# Patient Record
Sex: MALE | Race: WHITE
[De-identification: names, ages, dates, MRNs, and addresses within clinical notes are randomized per-mention and may not be internally consistent; named-entity substitution may affect disease eponyms.]

---

## 2019-02-28 ENCOUNTER — HOSPITAL ENCOUNTER (EMERGENCY)
Dept: HOSPITAL 74 - JER | Age: 54
LOS: 1 days | Discharge: HOME | End: 2019-03-01
Payer: SELF-PAY

## 2019-02-28 VITALS — BODY MASS INDEX: 29.6 KG/M2

## 2019-02-28 DIAGNOSIS — Z79.84: ICD-10-CM

## 2019-02-28 DIAGNOSIS — I10: ICD-10-CM

## 2019-02-28 DIAGNOSIS — E11.9: ICD-10-CM

## 2019-02-28 DIAGNOSIS — E78.00: ICD-10-CM

## 2019-02-28 DIAGNOSIS — R11.10: Primary | ICD-10-CM

## 2019-02-28 PROCEDURE — 3E0337Z INTRODUCTION OF ELECTROLYTIC AND WATER BALANCE SUBSTANCE INTO PERIPHERAL VEIN, PERCUTANEOUS APPROACH: ICD-10-PCS

## 2019-02-28 PROCEDURE — 3E033GC INTRODUCTION OF OTHER THERAPEUTIC SUBSTANCE INTO PERIPHERAL VEIN, PERCUTANEOUS APPROACH: ICD-10-PCS

## 2019-03-01 VITALS — TEMPERATURE: 97.8 F | HEART RATE: 66 BPM | DIASTOLIC BLOOD PRESSURE: 92 MMHG | SYSTOLIC BLOOD PRESSURE: 144 MMHG

## 2019-03-01 LAB
ALBUMIN SERPL-MCNC: 4.6 G/DL (ref 3.4–5)
ALP SERPL-CCNC: 127 U/L (ref 45–117)
ALT SERPL-CCNC: 57 U/L (ref 13–61)
ANION GAP SERPL CALC-SCNC: 8 MMOL/L (ref 8–16)
APPEARANCE UR: CLEAR
AST SERPL-CCNC: 29 U/L (ref 15–37)
BASOPHILS # BLD: 0.5 % (ref 0–2)
BILIRUB SERPL-MCNC: 0.6 MG/DL (ref 0.2–1)
BILIRUB UR STRIP.AUTO-MCNC: NEGATIVE MG/DL
BUN SERPL-MCNC: 14 MG/DL (ref 7–18)
CALCIUM SERPL-MCNC: 8.9 MG/DL (ref 8.5–10.1)
CHLORIDE SERPL-SCNC: 105 MMOL/L (ref 98–107)
CO2 SERPL-SCNC: 25 MMOL/L (ref 21–32)
COLOR UR: YELLOW
CREAT SERPL-MCNC: 0.9 MG/DL (ref 0.55–1.3)
DEPRECATED RDW RBC AUTO: 13 % (ref 11.9–15.9)
EOSINOPHIL # BLD: 3 % (ref 0–4.5)
GLUCOSE SERPL-MCNC: 107 MG/DL (ref 74–106)
HCT VFR BLD CALC: 43.4 % (ref 35.4–49)
HGB BLD-MCNC: 15.3 GM/DL (ref 11.7–16.9)
KETONES UR QL STRIP: (no result)
LEUKOCYTE ESTERASE UR QL STRIP.AUTO: NEGATIVE
LIPASE SERPL-CCNC: 136 U/L (ref 73–393)
LYMPHOCYTES # BLD: 22.1 % (ref 8–40)
MCH RBC QN AUTO: 28.9 PG (ref 25.7–33.7)
MCHC RBC AUTO-ENTMCNC: 35.3 G/DL (ref 32–35.9)
MCV RBC: 81.9 FL (ref 80–96)
MONOCYTES # BLD AUTO: 8.8 % (ref 3.8–10.2)
NEUTROPHILS # BLD: 65.6 % (ref 42.8–82.8)
NITRITE UR QL STRIP: NEGATIVE
PH UR: 5 [PH] (ref 5–8)
PLATELET # BLD AUTO: 271 K/MM3 (ref 134–434)
PMV BLD: 9.1 FL (ref 7.5–11.1)
POTASSIUM SERPLBLD-SCNC: 4.1 MMOL/L (ref 3.5–5.1)
PROT SERPL-MCNC: 8.1 G/DL (ref 6.4–8.2)
PROT UR QL STRIP: NEGATIVE
PROT UR QL STRIP: NEGATIVE
RBC # BLD AUTO: 5.29 M/MM3 (ref 4–5.6)
SODIUM SERPL-SCNC: 138 MMOL/L (ref 136–145)
SP GR UR: 1.02 (ref 1.01–1.03)
UROBILINOGEN UR STRIP-MCNC: 2 MG/DL (ref 0.2–1)
WBC # BLD AUTO: 8.2 K/MM3 (ref 4–10)

## 2019-03-01 NOTE — EKG
Test Reason : 

Blood Pressure : ***/*** mmHG

Vent. Rate : 061 BPM     Atrial Rate : 061 BPM

   P-R Int : 154 ms          QRS Dur : 096 ms

    QT Int : 424 ms       P-R-T Axes : 007 -07 005 degrees

   QTc Int : 426 ms

 

NORMAL SINUS RHYTHM

INFERIOR INFARCT , AGE UNDETERMINED

ABNORMAL ECG

NO PREVIOUS ECGS AVAILABLE

Confirmed by KEZIA GÓMEZ MD ([id number]) on 3/1/2019 10:18:03 AM

 

Referred By:             Confirmed By:KEZIA GÓMEZ MD

## 2019-03-01 NOTE — PDOC
History of Present Illness





- General


Chief Complaint: Pain


Stated Complaint: ABD PAIN


Time Seen by Provider: 03/01/19 00:24


History Source: Patient


Exam Limitations: No Limitations





- History of Present Illness


Travel History: No


Initial Comments: 





03/01/19 00:50





HISTORY OF PRESENT ILLNESS: 53-year-old male with past medical history non-

insulin dependent diabetes, hypertension, hyperlipidemia presents emergency 

department for evaluation of 3 weeks of postprandial epigastric pain with 

vomiting. Patient is unable to describe his pain but gives a reading of 9/10. 

He endorses and inability to hold down any solid foods for the past 3 weeks. 

Patient reports that her normal bowel movement earlier today. Patient's pain is 

accompanied by a "foul taste" the back of his mouth. Patient also complains of 

frequent urination which worsens on the overnight hours. Patient was seen by 

his primary doctor in 2/21 was given a prescription for Bentyl. Patient reports 

the Bentyl has not worked as he's been taking the medication after he eats. He 

denies fevers, chills, chest pain, shortness of breath, dysuria, hematuria, 

rectal bleeding, weight loss.





No recent travel or sick contacts. 


PAST MEDICAL HISTORY: HTN, NIDDM


SURGICAL HISTORY: Denies


ALLERGIES: No known drug allergies





REVIEW OF SYSTEMS


General/Constitutional: Denies fever or chills. Denies weakness, weight change.


HEENT: Denies change in vision. Denies ear pain or discharge. Denies sore 

throat.


Cardiovascular: Denies chest pain or shortness of breath.


Respiratory: Denies cough, wheezing, or hemoptysis.


Gastrointestinal: see HPI


Genitourinary:See HPI


Musculoskeletal: Denies joint or muscle swelling or pain. Denies neck or back 

pain.


Skin and breasts: Denies rash or easy bruising.


Neurologic: Denies headache, vertigo, loss of consciousness, or loss of 

sensation.


Psychiatric: Denies depression or anxiety.


Endocrine: Denies increased thirst. Denies abnormal weight change.


Hematologic/Lymphatic: Denies anemia, easy bleeding, or history of blood clots.


Allergic/Immunologic: Denies hives or skin allergy. Denies latex allergy.











PHYSICAL EXAM


General Appearance: Well-appearing, appropriately dressed.  No apparent distress

, no intoxication.


HEENT: EOMI, PERRLA, normal ENT inspection, normal voice, TMs normal, pharynx 

normal.  No conjunctival pallor.  No photophobia, scleral icterus.


Neck: Supple.  Trachea midline. No tenderness, rigidity, carotid bruit, stridor

, lymphadenopathy, or thyromegaly. 


Respiratory/Chest: Lungs CTAB.  No shortness of breath, chest tenderness, 

respiratory distress, accessory muscle use. No crackles, rales, rhonchi, stridor

, wheezing, dullness


Cardiovascular: RRR. S1, S2.  No JVD, murmur, bradycardia, tachycardia.


Vascular Pulses: Dorsalis-Pedis (R): 2+, Dorsalis-Pedis (L): 2+


Gastrointestinal/Abdominal: Normal bowel sounds. Abdomen soft, non-distended.  

No tenderness or rebound tenderness. No organomegaly, pulsatile mass, guarding, 

hernia, hepatomegaly, splenomegaly.


Lymphatic: No adenopathy, tenderness.


Integumentary: Appropriate color, dry, warm.  No cyanosis, erythema, jaundice 

or rash


Neurologic: CNs II-XII intact. Fully oriented, alert.  Appropriate mood/affect. 

Motor strength 5/5.  No appreciable EOM palsy, facial droop or sensory deficit.








Past History





- Past Medical History


Allergies/Adverse Reactions: 


 Allergies











Allergy/AdvReac Type Severity Reaction Status Date / Time


 


No Known Allergies Allergy   Verified 03/01/19 00:23











Home Medications: 


Ambulatory Orders





NK [No Known Home Medication]  03/01/19 











- Suicide/Smoking/Psychosocial Hx


Smoking History: Never smoked


Have you smoked in the past 12 months: No


Information on smoking cessation initiated: No


Hx Alcohol Use: No


Drug/Substance Use Hx: No





*Physical Exam





- Vital Signs


 Last Vital Signs











Temp Pulse Resp BP Pulse Ox


 


 97.8 F   66   19   144/92   98 


 


 02/28/19 23:20  02/28/19 23:20  02/28/19 23:20  02/28/19 23:20  02/28/19 23:20














Moderate Sedation





- Procedure Monitoring


Vital Signs: 


Procedure Monitoring Vital Signs











Temperature  97.8 F   02/28/19 23:20


 


Pulse Rate  66   02/28/19 23:20


 


Respiratory Rate  19   02/28/19 23:20


 


Blood Pressure  144/92   02/28/19 23:20


 


O2 Sat by Pulse Oximetry (%)  98   02/28/19 23:20











**Heart Score/ECG Review





- History


History: Slightly suspicious





- Electrocardiogram


EKG: Normal





- Age


Age: 45-65





- Risk Factors


Risk Factors Heart Score: Yes Hx Hypercholesterolemia, Yes Hx Hypertension, Yes 

Hx Diabetes


Based on the list above the patient has:: >/=3 risk factors or Hx 

atherosclerotic disease





- Troponin


Troponin: </= normal limit





- Score


Heart Score - Total: 3





- ECG Intrepretation


Rhythm: Regular Rhythm





- ECG Impressions


Normal ECG: Yes





ED Treatment Course





- LABORATORY


CBC & Chemistry Diagram: 


 03/01/19 00:41





 03/01/19 00:41





Medical Decision Making





- Medical Decision Making





03/01/19 00:50


A/P:


53-year-old male with postprandial abdominal pain, nausea and vomiting for 3 

weeks





Differential diagnosis includes but not limited to ACS, pancreatitis, 

gallbladder disease, gastritis, gastroparesis, metabolic abnormality, GERD, 

malignancy, hyperglycemia





Labs including cardiac profile and lipase


EKG


Urinalysis, urine culture


Normal saline 1 L


Zofran 4 mg


03/01/19 01:48


CBC is within normal limits


CMP is within normal limits


Lipase 136


UA reveals trace ketones is consistent with the patient vomiting. Negative 

glucose


Troponin 0.05.





EKG is reviewed by me and interpreted by Dr. Cavazos: Sinus rhythm with rate of 

61. Normal intervals noted. No ischemic changes present.


03/01/19 02:01


Repeat abdominal exam is unchanged and remains unremarkable.


Patient is tolerating PO's at this time. I will discharge the home to follow-up 

with his primary doctor and continue his current plan of care.





I discussed the physical exam findings, ancillary test results and final 

diagnoses with the patient. I answered all of the patient's questions. The 

patient was satisfied with the care received and felt comfortable with the 

discharge plan and treatment plan. The patient will call their primary care 

physician within 24 hours to arrange follow-up and will return to the Emergency 

Department with any new, persistent or worsening symptoms. 








*DC/Admit/Observation/Transfer


Diagnosis at time of Disposition: 


Vomiting


Qualifiers:


 Vomiting type: unspecified Vomiting Intractability: non-intractable Nausea 

presence: without nausea Qualified Code(s): R11.11 - Vomiting without nausea








- Discharge Dispostion


Disposition: HOME


Condition at time of disposition: Fair





- Referrals


Referrals: 


Oh Harris MD [Primary Care Provider] - 





- Patient Instructions


Additional Instructions: 


Rest, drink lots of fluids: Teas, water, soups


Ginger ale, carbonated beverages for the bubbles


May try peppermint teas


Avoid heavy, spicy or fatty foods until symptoms have resolved 





Lots of handwashing and good hygiene





Continue over-the-counter medications for symptomatic relief


Tylenol for fever and pain


Continue Bentyl as previously prescribed.





Followup with private physician in one to 2 days as needed


Return to emergency department for worsened symptoms, fevers, dehydration














sal [place] muchos lquidos: Ts, agua, sopas.


Ginger ale, bebidas carbonatadas para las burbujas.


Puede probar t de menta


Evite los alimentos pesados, picantes o grasos hasta que los sntomas hayan 

desaparecido.





Mucho lavado de hernandez y buena higiene.





Continuar con los medicamentos de venta cara para el alivio sintomtico.


Tylenol para la fiebre y el dolor.


Continuar Bentyl segn lo prescrito anteriormente.





Seguimiento con mdico privado en 1 a 2 wing segn sea necesario.


Volver al servicio de urgencias para los sntomas empeorados, fiebres, 

deshidratacin.





- Post Discharge Activity

## 2019-03-02 ENCOUNTER — HOSPITAL ENCOUNTER (EMERGENCY)
Dept: HOSPITAL 74 - JER | Age: 54
LOS: 1 days | Discharge: HOME | End: 2019-03-03
Payer: SELF-PAY

## 2019-03-02 VITALS — SYSTOLIC BLOOD PRESSURE: 154 MMHG | TEMPERATURE: 97.8 F | DIASTOLIC BLOOD PRESSURE: 86 MMHG | HEART RATE: 69 BPM

## 2019-03-02 VITALS — BODY MASS INDEX: 29.6 KG/M2

## 2019-03-02 DIAGNOSIS — I10: ICD-10-CM

## 2019-03-02 DIAGNOSIS — E78.5: ICD-10-CM

## 2019-03-02 DIAGNOSIS — R10.13: Primary | ICD-10-CM

## 2019-03-02 DIAGNOSIS — Z79.84: ICD-10-CM

## 2019-03-02 DIAGNOSIS — E11.9: ICD-10-CM

## 2019-03-02 LAB
ALBUMIN SERPL-MCNC: 4.6 G/DL (ref 3.4–5)
ALP SERPL-CCNC: 112 U/L (ref 45–117)
ALT SERPL-CCNC: 58 U/L (ref 13–61)
ANION GAP SERPL CALC-SCNC: 10 MMOL/L (ref 8–16)
APPEARANCE UR: CLEAR
AST SERPL-CCNC: 38 U/L (ref 15–37)
BASOPHILS # BLD: 0.5 % (ref 0–2)
BILIRUB SERPL-MCNC: 0.6 MG/DL (ref 0.2–1)
BILIRUB UR STRIP.AUTO-MCNC: NEGATIVE MG/DL
BUN SERPL-MCNC: 11 MG/DL (ref 7–18)
CALCIUM SERPL-MCNC: 8.7 MG/DL (ref 8.5–10.1)
CHLORIDE SERPL-SCNC: 106 MMOL/L (ref 98–107)
CO2 SERPL-SCNC: 24 MMOL/L (ref 21–32)
COLOR UR: (no result)
CREAT SERPL-MCNC: 0.8 MG/DL (ref 0.55–1.3)
DEPRECATED RDW RBC AUTO: 13 % (ref 11.9–15.9)
EOSINOPHIL # BLD: 2.6 % (ref 0–4.5)
GLUCOSE SERPL-MCNC: 80 MG/DL (ref 74–106)
HCT VFR BLD CALC: 40.4 % (ref 35.4–49)
HGB BLD-MCNC: 14.4 GM/DL (ref 11.7–16.9)
KETONES UR QL STRIP: NEGATIVE
LEUKOCYTE ESTERASE UR QL STRIP.AUTO: NEGATIVE
LIPASE SERPL-CCNC: 134 U/L (ref 73–393)
LYMPHOCYTES # BLD: 30.9 % (ref 8–40)
MCH RBC QN AUTO: 29.2 PG (ref 25.7–33.7)
MCHC RBC AUTO-ENTMCNC: 35.7 G/DL (ref 32–35.9)
MCV RBC: 81.8 FL (ref 80–96)
MONOCYTES # BLD AUTO: 13 % (ref 3.8–10.2)
NEUTROPHILS # BLD: 53 % (ref 42.8–82.8)
NITRITE UR QL STRIP: NEGATIVE
PH UR: 6 [PH] (ref 5–8)
PLATELET # BLD AUTO: 280 K/MM3 (ref 134–434)
PMV BLD: 8.3 FL (ref 7.5–11.1)
POTASSIUM SERPLBLD-SCNC: 4 MMOL/L (ref 3.5–5.1)
PROT SERPL-MCNC: 7.7 G/DL (ref 6.4–8.2)
PROT UR QL STRIP: NEGATIVE
PROT UR QL STRIP: NEGATIVE
RBC # BLD AUTO: 4.93 M/MM3 (ref 4–5.6)
SODIUM SERPL-SCNC: 140 MMOL/L (ref 136–145)
SP GR UR: 1.01 (ref 1.01–1.03)
UROBILINOGEN UR STRIP-MCNC: NEGATIVE MG/DL (ref 0.2–1)
WBC # BLD AUTO: 5.8 K/MM3 (ref 4–10)

## 2019-03-02 PROCEDURE — 3E033NZ INTRODUCTION OF ANALGESICS, HYPNOTICS, SEDATIVES INTO PERIPHERAL VEIN, PERCUTANEOUS APPROACH: ICD-10-PCS | Performed by: EMERGENCY MEDICINE

## 2019-03-02 PROCEDURE — 3E033GC INTRODUCTION OF OTHER THERAPEUTIC SUBSTANCE INTO PERIPHERAL VEIN, PERCUTANEOUS APPROACH: ICD-10-PCS | Performed by: EMERGENCY MEDICINE

## 2019-03-02 NOTE — PDOC
Attending Attestation





- HPI


HPI: 


This patient is a 53 year old male with PMHx of HTN, NIDDM, HLD, who presents 

to ED with 3 weeks of epigastric pain. Patient was seen by his primary doctor 

on 2/21 and was given a prescription for Bentyl. Patient reports the Bentyl has 

not worked as he's been taking the medication after he eats. Patient states 

that he has had this pain before and gets it every year but it has never lasted 

this long before. Patient states the pain is  cramp-like, localized in 

epigastric area, does not radiate, unaware of any exacerbating or alleviating 

factors,  rates 9/10, associated with nausea and early satiety. Also endorses 

urinary frequency and slight dysuria, frontal headache for which he attributes 

to not eating or drinking well. He was seen here a yesterday for similar 

complaints. 





Denies any vomit, diarrhea, constipation, blood in stool, fevers, chills, chest 

pain, SOB, hematuria, syncope, pain radiating to the back, flank pain. 





PMD:Oh Harris MD


PMH: see hpi


PSH: none


Meds: metformin, losartan, dicyclomine


Allergies: nkda


Social: denies








03/02/19 21:27


Patient has dup chart from 3/1/19








- Physicial Exam


PE: 


GENERAL: Awake, alert, and fully oriented, in no acute distress


HEAD: No signs of trauma


EYES: PERRLA, EOMI, sclera anicteric, conjunctiva clear


LUNGS: Breath sounds equal, clear to auscultation bilaterally.  No wheezes, and 

no crackles


HEART: Regular rate and rhythm, normal S1 and S2, no murmurs, rubs or gallops


ABDOMEN: Soft, nontender, normoactive bowel sounds.  No guarding, no rebound.  

No masses


EXTREMITIES: Normal range of motion, no edema.  No clubbing or cyanosis. No 

cords, erythema, or tenderness


NEUROLOGICAL: Cranial nerves II through XII grossly intact.  Normal speech, 

normal gait


SKIN: Warm, Dry, normal turgor, no rashes or lesions noted.





03/02/19 21:26








<Tiara Wolfe - Last Filed: 03/02/19 21:27>





- Resident


Resident Name: Tatyana Polanco





- Medical Decision Making





03/02/19 23:16


Pt presents to the ED complaining of epigastric pain that has been persistent 

for three weeks.  Denies chest pain, shortness of breath, nausea or vomiting.  

History of multiple prior episodes of similar pain which resolved spontaneously 

in the past.  Labs show troponin of 0.06.  Labs drawn yesterday under a 

different MR showed troponin of 0.05.  Will check repeat troponin and admit if 

rising.  Will discharge home with cardiology and GI follow up if decreasing. 


03/02/19 23:21








<Paloma Chavez - Last Filed: 03/02/19 23:22>





Attestations





- Attestations





03/02/19 21:26





Documentation prepared by Tiara Wolfe, acting as medical scribe for 

Paloma Chavez MD.





<Tiara Wolfe - Last Filed: 03/02/19 21:27>

## 2019-03-02 NOTE — PDOC
History of Present Illness





- General


Chief Complaint: Pain, Acute


Stated Complaint: NAUSEA/HEADACHE


Time Seen by Provider: 03/02/19 19:23


History Source: Patient


Exam Limitations: Language Barrier





- History of Present Illness


Travel History: No


Initial Comments: 





03/02/19 19:55


Pt is a 54yo M with PMH of HTN, NIDDM presenting to ED with 2 weeks of 

epigastric pain and nausea. Pt has had this pain before and gets it every year 

but it has never lasted this long before. Pt states the pain is in the 

epigastric area, does not radiate, 9/10, cramping, associated with nausea and 

early satiety. He feels the need to vomit to relieve the pain but he has not 

vomited. Also endorses urinary frequency and slight dysuria. Also is 

experiencing a frontal headache for which he attributes to not eating or 

drinking well. He denies diarrhea, constipation, blood in stool, fevers, chills

, chest pain, SOB, hematuria, syncope, pain radiating to the back, flank pain. 

He was seen here a few days ago for similar complaints. 





PMD: [place]


PMH: see hpi


PSH: none


Meds: metformin, losartan, dicyclomine


Allergies: nkda


Social: denies








Past History





- Past Medical History


Allergies/Adverse Reactions: 


 Allergies











Allergy/AdvReac Type Severity Reaction Status Date / Time


 


No Known Allergies Allergy   Verified 03/02/19 18:43











Home Medications: 


Ambulatory Orders





Losartan Potassium 100 mg PO DAILY 03/02/19 


metFORMIN HCL [Metformin HCl] 500 mg PO BID 03/02/19 








COPD: No


HTN: Yes





- Suicide/Smoking/Psychosocial Hx


Smoking Status: No


Smoking History: Never smoked


Years of Tobacco Use: 0


Number of Cigarettes Smoked Daily: 0


Cigars Per Day: 0





**Review of Systems





- Review of Systems


Constitutional: No: Chills, Fever, Weakness


HEENTM: No: Symptoms Reported


Respiratory: No: Cough, Shortness of Breath


Cardiac (ROS): Yes: Lightheadedness.  No: Chest Pain, Palpitations, Syncope


ABD/GI: Yes: See HPI, Nausea, Abdominal cramping.  No: Constipated, Diarrhea, 

Difficulty Swallowing, Rectal Bleeding, Vomiting, Tarry Stools


: Yes: Dysuria, Frequency.  No: Flank Pain, Hematuria, Incontinence, Urgency, 

Testicular Pain


Musculoskeletal: No: Symptoms Reported


Integumentary: No: Symptoms Reported


Neurological: Yes: Headache.  No: Numbness, Tingling, Tremors, Weakness





*Physical Exam





- Vital Signs


 Last Vital Signs











Temp Pulse Resp BP Pulse Ox


 


 97.8 F   69   19   154/86   98 


 


 03/02/19 18:43  03/02/19 18:43  03/02/19 18:43  03/02/19 18:43  03/02/19 18:43














- Physical Exam


General Appearance: Yes: Nourished, Appropriately Dressed.  No: Apparent 

Distress


HEENT: positive: EOMI, HUNG, Pharynx Normal


Neck: positive: Trachea midline, Supple.  negative: Lymphadenopathy (R), 

Lymphadenopathy (L)


Respiratory/Chest: positive: Lungs Clear, Normal Breath Sounds.  negative: 

Crackles, Rales, Rhonchi, Stridor, Wheezing


Cardiovascular: positive: Regular Rhythm, Regular Rate, S1, S2.  negative: Edema

, JVD, Murmur


Vascular Pulses: Carotid (R): 2+, Carotid (L): 2+, Dorsalis-Pedis (R): 2+, 

Doralis-Pedis (L): 2+


Gastrointestinal/Abdominal: positive: Normal Bowel Sounds, Soft.  negative: 

Distended, Guarding, Rebound, Tenderness


Musculoskeletal: negative: CVA Tenderness (R), CVA Tenderness (L)


Extremity: positive: Normal Capillary Refill, Normal Inspection


Integumentary: positive: Normal Color, Dry, Warm


Neurologic: positive: CNs II-XII NML intact, Fully Oriented, Alert, Normal Mood/

Affect, Normal Response, Motor Strength 5/5





Moderate Sedation





- Procedure Monitoring


Vital Signs: 


Procedure Monitoring Vital Signs











Temperature  97.8 F   03/02/19 18:43


 


Pulse Rate  69   03/02/19 18:43


 


Respiratory Rate  19   03/02/19 18:43


 


Blood Pressure  154/86   03/02/19 18:43


 


O2 Sat by Pulse Oximetry (%)  98   03/02/19 18:43











ED Treatment Course





- LABORATORY


CBC & Chemistry Diagram: 


 03/02/19 19:50





 03/02/19 19:50





Medical Decision Making





- Medical Decision Making





03/02/19 19:59


Pt is a 54yo M with PMH of HTN, NIDDM presenting to ED with 2 weeks of 

epigastric pain and nausea. Pt has had this pain before and gets it every year 

but it has never lasted this long before. Pt states the pain is in the 

epigastric area, does not radiate, 9/10, cramping, associated with nausea and 

early satiety. He feels the need to vomit to relieve the pain but he has not 

vomited. Also endorses urinary frequency and slight dysuria. Also is 

experiencing a frontal headache for which he attributes to not eating or 

drinking well. He denies diarrhea, constipation, blood in stool, fevers, chills

, chest pain, SOB, hematuria, syncope, pain radiating to the back. He was seen 

here a few days ago for similar complaints. 


   Vitals: wnl   


   PE: No tenderness, no pulsatile mass





Low suspicions for pancreatitis, cholecystitis, colitis, nephrolithiasis. 


Low suspicion for AAA, Dissection and PE given normal vital signs, no pulsatile 

mass, no tearing chest/back pain, no dyspnea, pain is similar to prior 

episodes. 


Will order trop and ekg given pt age, h/o dm and htn





-cbc, cmp, lipase, trop


-ekg


-maalox, tylenol, pepcid, zofran. 








Labs significant for trop being 0.06. Pt states he was here 2/28 and has 

paperwork showing that he was although electronic record through this chart is 

not seen. Trop 2/28 was 0.05. Pt is denying chest pain, pleuritic chest pain, 

sob, leg swelling, dyspnea on exertion. Will repeat trop in 3 hours. 


EKG shows inverted t in lead 3 with inverted p. No other t wave inversions. 

NSR. No JAEL or depressions. 








Upon drawing repeat trop, pt states he was feeling better. 


Repeat trop 0.05. Pt not having chest pain and has not been having chest pains. 

Pain is similar to prior episodes and is just not getting better. However pt 

feels better now. Does not need admission to pursue troponin at this time. Will 

refer to cardiology. Will also refer to GI for symptoms. 





Pt understands return precautions. Will DC home. 





*DC/Admit/Observation/Transfer


Diagnosis at time of Disposition: 


 Epigastric pain








- Discharge Dispostion


Disposition: HOME


Condition at time of disposition: Good


Decision to Admit order: No





- Referrals


Referrals: 


Oh Harris MD [Non Staff, Medical] - 


Judie Bonner DO [Staff Physician] - 


Esteban Mendieta MD [Staff Physician] - 





- Patient Instructions


Printed Discharge Instructions:  DI for Atypical Chest Pain, DI for Epigastric 

Pain


Additional Instructions: 


You were seen in the emergency room for epigastric abdominal pain. This may be 

due to espogeal spasms. The lab tests show that an enzyme released by the heart 

is a little high. You need to see a cardiologist. 





A referral was sent to Dr. Mendieta [phone]. I highly suggest that you call 

and make an appointment. You should tell them that you were seen in the 

emergency room and you were referred to see a cardiologist. Please remember to 

do this, it is important.





You should also see a GI doctor (for the stomach). Dr. Bonner [phone]. 





Make sure you make an appointment with Dr. Hernandez this week. 








Come back to the emergency room if you start to develop chest pain, you feel 

short of breath, you break out into a sweat, you have pain in the chest that 

goes to your shoulders, you start vomiting blood or if any new concerning 

symptom develops. 





Thank you





- Post Discharge Activity

## 2019-03-05 NOTE — EKG
Test Reason : 

Blood Pressure : ***/*** mmHG

Vent. Rate : 066 BPM     Atrial Rate : 066 BPM

   P-R Int : 154 ms          QRS Dur : 096 ms

    QT Int : 396 ms       P-R-T Axes : 000 -12 000 degrees

   QTc Int : 415 ms

 

NORMAL SINUS RHYTHM

INCOMPLETE RIGHT BUNDLE BRANCH BLOCK

INFERIOR INFARCT , AGE UNDETERMINED

ABNORMAL ECG

NO PREVIOUS ECGS AVAILABLE

Confirmed by MD Yannick, Elan ([id number]) on 3/5/2019 1:51:34 PM

 

Referred By:             Confirmed By:Elan Lanier MD

## 2022-09-02 ENCOUNTER — HOSPITAL ENCOUNTER (EMERGENCY)
Dept: HOSPITAL 74 - JER | Age: 57
LOS: 1 days | Discharge: ANOTHER HOSPITAL | End: 2022-09-03
Payer: SELF-PAY

## 2022-09-02 VITALS — RESPIRATION RATE: 18 BRPM

## 2022-09-02 VITALS — BODY MASS INDEX: 29.9 KG/M2

## 2022-09-02 DIAGNOSIS — R55: Primary | ICD-10-CM

## 2022-09-02 LAB
ALBUMIN SERPL-MCNC: 3.9 G/DL (ref 3.4–5)
ALP SERPL-CCNC: 113 U/L (ref 45–117)
ALT SERPL-CCNC: 45 U/L (ref 13–61)
ANION GAP SERPL CALC-SCNC: 7 MMOL/L (ref 8–16)
AST SERPL-CCNC: 30 U/L (ref 15–37)
BASOPHILS # BLD: 0.4 % (ref 0–2)
BILIRUB SERPL-MCNC: 0.6 MG/DL (ref 0.2–1)
BUN SERPL-MCNC: 22.6 MG/DL (ref 7–18)
CALCIUM SERPL-MCNC: 8.7 MG/DL (ref 8.5–10.1)
CHLORIDE SERPL-SCNC: 106 MMOL/L (ref 98–107)
CO2 SERPL-SCNC: 29 MMOL/L (ref 21–32)
CREAT SERPL-MCNC: 0.8 MG/DL (ref 0.55–1.3)
DEPRECATED RDW RBC AUTO: 13.2 % (ref 11.9–15.9)
EOSINOPHIL # BLD: 2.1 % (ref 0–4.5)
GLUCOSE SERPL-MCNC: 85 MG/DL (ref 74–106)
HCT VFR BLD CALC: 40.9 % (ref 35.4–49)
HGB BLD-MCNC: 13.9 GM/DL (ref 11.7–16.9)
LYMPHOCYTES # BLD: 17.7 % (ref 8–40)
MCH RBC QN AUTO: 27.8 PG (ref 25.7–33.7)
MCHC RBC AUTO-ENTMCNC: 34 G/DL (ref 32–35.9)
MCV RBC: 81.6 FL (ref 80–96)
MONOCYTES # BLD AUTO: 10.1 % (ref 3.8–10.2)
NEUTROPHILS # BLD: 69.7 % (ref 42.8–82.8)
PLATELET # BLD AUTO: 252 10^3/UL (ref 134–434)
PMV BLD: 9.5 FL (ref 7.5–11.1)
PROT SERPL-MCNC: 7.3 G/DL (ref 6.4–8.2)
RBC # BLD AUTO: 5.01 M/MM3 (ref 4–5.6)
SODIUM SERPL-SCNC: 141 MMOL/L (ref 136–145)
WBC # BLD AUTO: 8.3 K/MM3 (ref 4–10)

## 2022-09-02 PROCEDURE — 3E033GC INTRODUCTION OF OTHER THERAPEUTIC SUBSTANCE INTO PERIPHERAL VEIN, PERCUTANEOUS APPROACH: ICD-10-PCS

## 2022-09-03 VITALS — SYSTOLIC BLOOD PRESSURE: 145 MMHG | DIASTOLIC BLOOD PRESSURE: 78 MMHG

## 2022-09-03 VITALS — HEART RATE: 53 BPM | TEMPERATURE: 98.6 F

## 2023-01-27 ENCOUNTER — HOSPITAL ENCOUNTER (OUTPATIENT)
Dept: HOSPITAL 74 - JER | Age: 58
Setting detail: OBSERVATION
LOS: 2 days | Discharge: HOME | End: 2023-01-29
Attending: INTERNAL MEDICINE | Admitting: INTERNAL MEDICINE
Payer: COMMERCIAL

## 2023-01-27 VITALS — BODY MASS INDEX: 28.1 KG/M2

## 2023-01-27 DIAGNOSIS — Z95.5: ICD-10-CM

## 2023-01-27 DIAGNOSIS — R35.89: ICD-10-CM

## 2023-01-27 DIAGNOSIS — R53.1: ICD-10-CM

## 2023-01-27 DIAGNOSIS — I25.10: Primary | ICD-10-CM

## 2023-01-27 DIAGNOSIS — E11.65: ICD-10-CM

## 2023-01-27 DIAGNOSIS — E11.40: ICD-10-CM

## 2023-01-27 DIAGNOSIS — I11.9: ICD-10-CM

## 2023-01-27 DIAGNOSIS — E78.5: ICD-10-CM

## 2023-01-27 DIAGNOSIS — Z95.1: ICD-10-CM

## 2023-01-27 DIAGNOSIS — I25.2: ICD-10-CM

## 2023-01-27 LAB
ALBUMIN SERPL-MCNC: 4 G/DL (ref 3.4–5)
ALP SERPL-CCNC: 189 U/L (ref 45–117)
ALT SERPL-CCNC: 42 U/L (ref 13–61)
ANION GAP SERPL CALC-SCNC: 9 MMOL/L (ref 8–16)
APPEARANCE UR: CLEAR
AST SERPL-CCNC: 26 U/L (ref 15–37)
BASE EXCESS BLDV CALC-SCNC: -2.5 MMOL/L (ref -2–2)
BASOPHILS # BLD: 0.7 % (ref 0–2)
BILIRUB SERPL-MCNC: 1 MG/DL (ref 0.2–1)
BILIRUB UR STRIP.AUTO-MCNC: NEGATIVE MG/DL
BUN SERPL-MCNC: 14.7 MG/DL (ref 7–18)
CALCIUM SERPL-MCNC: 9 MG/DL (ref 8.5–10.1)
CHLORIDE SERPL-SCNC: 102 MMOL/L (ref 98–107)
CO2 SERPL-SCNC: 25 MMOL/L (ref 21–32)
COLOR UR: YELLOW
CREAT SERPL-MCNC: 0.9 MG/DL (ref 0.55–1.3)
DEPRECATED RDW RBC AUTO: 13.2 % (ref 11.9–15.9)
EOSINOPHIL # BLD: 6.1 % (ref 0–4.5)
GLUCOSE SERPL-MCNC: 360 MG/DL (ref 74–106)
HCT VFR BLD CALC: 42.2 % (ref 35.4–49)
HCT VFR BLDV CALC: 46 % (ref 35.4–49)
HGB BLD-MCNC: 14.7 GM/DL (ref 11.7–16.9)
KETONES UR QL STRIP: (no result)
LEUKOCYTE ESTERASE UR QL STRIP.AUTO: NEGATIVE
LYMPHOCYTES # BLD: 23.5 % (ref 8–40)
MAGNESIUM SERPL-MCNC: 2 MG/DL (ref 1.8–2.4)
MCH RBC QN AUTO: 26.6 PG (ref 25.7–33.7)
MCHC RBC AUTO-ENTMCNC: 34.8 G/DL (ref 32–35.9)
MCV RBC: 76.6 FL (ref 80–96)
MONOCYTES # BLD AUTO: 8.9 % (ref 3.8–10.2)
NEUTROPHILS # BLD: 60.8 % (ref 42.8–82.8)
NITRITE UR QL STRIP: NEGATIVE
PCO2 BLDV: 40 MMHG (ref 38–52)
PH BLDV: 7.37 [PH] (ref 7.31–7.41)
PH UR: 6 [PH] (ref 5–8)
PHOSPHATE SERPL-MCNC: 4.1 MG/DL (ref 2.5–4.9)
PLATELET # BLD AUTO: 232 10^3/UL (ref 134–434)
PMV BLD: 9.6 FL (ref 7.5–11.1)
PROT SERPL-MCNC: 7.2 G/DL (ref 6.4–8.2)
PROT UR QL STRIP: (no result)
PROT UR QL STRIP: NEGATIVE
RBC # BLD AUTO: 5.51 M/MM3 (ref 4–5.6)
SAO2 % BLDV: 83.8 % (ref 70–80)
SODIUM SERPL-SCNC: 136 MMOL/L (ref 136–145)
SP GR UR: 1.04 (ref 1.01–1.03)
UROBILINOGEN UR STRIP-MCNC: 0.2 MG/DL (ref 0.2–1)
WBC # BLD AUTO: 6.5 K/MM3 (ref 4–10)

## 2023-01-27 PROCEDURE — G0378 HOSPITAL OBSERVATION PER HR: HCPCS

## 2023-01-27 PROCEDURE — 3E0337Z INTRODUCTION OF ELECTROLYTIC AND WATER BALANCE SUBSTANCE INTO PERIPHERAL VEIN, PERCUTANEOUS APPROACH: ICD-10-PCS | Performed by: INTERNAL MEDICINE

## 2023-01-27 PROCEDURE — 3E013VG INTRODUCTION OF INSULIN INTO SUBCUTANEOUS TISSUE, PERCUTANEOUS APPROACH: ICD-10-PCS | Performed by: INTERNAL MEDICINE

## 2023-01-27 RX ADMIN — LOSARTAN POTASSIUM SCH MG: 50 TABLET, FILM COATED ORAL at 14:05

## 2023-01-27 RX ADMIN — ASPIRIN SCH MG: 81 TABLET, COATED ORAL at 16:14

## 2023-01-28 LAB
ALBUMIN SERPL-MCNC: 3.2 G/DL (ref 3.4–5)
ALP SERPL-CCNC: 122 U/L (ref 45–117)
ALT SERPL-CCNC: 32 U/L (ref 13–61)
ANION GAP SERPL CALC-SCNC: 5 MMOL/L (ref 8–16)
AST SERPL-CCNC: 12 U/L (ref 15–37)
BASOPHILS # BLD: 0.9 % (ref 0–2)
BILIRUB SERPL-MCNC: 1 MG/DL (ref 0.2–1)
BUN SERPL-MCNC: 13.6 MG/DL (ref 7–18)
CALCIUM SERPL-MCNC: 7.8 MG/DL (ref 8.5–10.1)
CHLORIDE SERPL-SCNC: 110 MMOL/L (ref 98–107)
CO2 SERPL-SCNC: 25 MMOL/L (ref 21–32)
CREAT SERPL-MCNC: 0.7 MG/DL (ref 0.55–1.3)
DEPRECATED RDW RBC AUTO: 13.8 % (ref 11.9–15.9)
EOSINOPHIL # BLD: 6.7 % (ref 0–4.5)
GLUCOSE SERPL-MCNC: 188 MG/DL (ref 74–106)
HCT VFR BLD CALC: 37.9 % (ref 35.4–49)
HGB BLD-MCNC: 13.1 GM/DL (ref 11.7–16.9)
LYMPHOCYTES # BLD: 35.6 % (ref 8–40)
MAGNESIUM SERPL-MCNC: 1.8 MG/DL (ref 1.8–2.4)
MCH RBC QN AUTO: 26.3 PG (ref 25.7–33.7)
MCHC RBC AUTO-ENTMCNC: 34.4 G/DL (ref 32–35.9)
MCV RBC: 76.4 FL (ref 80–96)
MONOCYTES # BLD AUTO: 9.7 % (ref 3.8–10.2)
NEUTROPHILS # BLD: 47.1 % (ref 42.8–82.8)
PHOSPHATE SERPL-MCNC: 2.9 MG/DL (ref 2.5–4.9)
PLATELET # BLD AUTO: 213 10^3/UL (ref 134–434)
PMV BLD: 9.1 FL (ref 7.5–11.1)
PROT SERPL-MCNC: 5.9 G/DL (ref 6.4–8.2)
RBC # BLD AUTO: 4.96 M/MM3 (ref 4–5.6)
SODIUM SERPL-SCNC: 140 MMOL/L (ref 136–145)
WBC # BLD AUTO: 5.7 K/MM3 (ref 4–10)

## 2023-01-28 RX ADMIN — GABAPENTIN SCH MG: 300 CAPSULE ORAL at 14:28

## 2023-01-28 RX ADMIN — INSULIN ASPART SCH UNITS: 100 INJECTION, SOLUTION INTRAVENOUS; SUBCUTANEOUS at 04:43

## 2023-01-28 RX ADMIN — INSULIN ASPART SCH UNITS: 100 INJECTION, SOLUTION INTRAVENOUS; SUBCUTANEOUS at 23:44

## 2023-01-28 RX ADMIN — ASPIRIN SCH MG: 81 TABLET, COATED ORAL at 10:15

## 2023-01-28 RX ADMIN — INSULIN ASPART SCH UNITS: 100 INJECTION, SOLUTION INTRAVENOUS; SUBCUTANEOUS at 17:35

## 2023-01-28 RX ADMIN — METFORMIN HYDROCHLORIDE SCH MG: 500 TABLET ORAL at 17:36

## 2023-01-28 RX ADMIN — GABAPENTIN SCH MG: 300 CAPSULE ORAL at 10:15

## 2023-01-28 RX ADMIN — LOSARTAN POTASSIUM SCH MG: 50 TABLET, FILM COATED ORAL at 10:15

## 2023-01-28 RX ADMIN — GABAPENTIN SCH MG: 300 CAPSULE ORAL at 00:33

## 2023-01-28 RX ADMIN — GABAPENTIN SCH MG: 300 CAPSULE ORAL at 23:44

## 2023-01-28 RX ADMIN — INSULIN ASPART SCH UNITS: 100 INJECTION, SOLUTION INTRAVENOUS; SUBCUTANEOUS at 12:05

## 2023-01-28 RX ADMIN — CLOPIDOGREL BISULFATE SCH MG: 75 TABLET, FILM COATED ORAL at 10:15

## 2023-01-28 RX ADMIN — INSULIN ASPART SCH: 100 INJECTION, SOLUTION INTRAVENOUS; SUBCUTANEOUS at 07:57

## 2023-01-29 VITALS
HEART RATE: 60 BPM | SYSTOLIC BLOOD PRESSURE: 134 MMHG | RESPIRATION RATE: 16 BRPM | DIASTOLIC BLOOD PRESSURE: 79 MMHG | TEMPERATURE: 97.8 F

## 2023-01-29 LAB
ANION GAP SERPL CALC-SCNC: 6 MMOL/L (ref 8–16)
BUN SERPL-MCNC: 10.7 MG/DL (ref 7–18)
CALCIUM SERPL-MCNC: 8.6 MG/DL (ref 8.5–10.1)
CHLORIDE SERPL-SCNC: 107 MMOL/L (ref 98–107)
CO2 SERPL-SCNC: 27 MMOL/L (ref 21–32)
CREAT SERPL-MCNC: 0.8 MG/DL (ref 0.55–1.3)
DEPRECATED RDW RBC AUTO: 13.6 % (ref 11.9–15.9)
GLUCOSE SERPL-MCNC: 204 MG/DL (ref 74–106)
HCT VFR BLD CALC: 39.4 % (ref 35.4–49)
HGB BLD-MCNC: 13.5 GM/DL (ref 11.7–16.9)
MCH RBC QN AUTO: 26.2 PG (ref 25.7–33.7)
MCHC RBC AUTO-ENTMCNC: 34.3 G/DL (ref 32–35.9)
MCV RBC: 76.4 FL (ref 80–96)
PLATELET # BLD AUTO: 237 10^3/UL (ref 134–434)
PMV BLD: 9.6 FL (ref 7.5–11.1)
RBC # BLD AUTO: 5.16 M/MM3 (ref 4–5.6)
SODIUM SERPL-SCNC: 140 MMOL/L (ref 136–145)
WBC # BLD AUTO: 6.1 K/MM3 (ref 4–10)

## 2023-01-29 RX ADMIN — INSULIN ASPART SCH UNITS: 100 INJECTION, SOLUTION INTRAVENOUS; SUBCUTANEOUS at 06:35

## 2023-01-29 RX ADMIN — INSULIN ASPART SCH UNITS: 100 INJECTION, SOLUTION INTRAVENOUS; SUBCUTANEOUS at 10:51

## 2023-01-29 RX ADMIN — ASPIRIN SCH MG: 81 TABLET, COATED ORAL at 09:52

## 2023-01-29 RX ADMIN — CLOPIDOGREL BISULFATE SCH MG: 75 TABLET, FILM COATED ORAL at 09:52

## 2023-01-29 RX ADMIN — GABAPENTIN SCH MG: 300 CAPSULE ORAL at 06:34

## 2023-01-29 RX ADMIN — ENOXAPARIN SODIUM SCH: 40 INJECTION SUBCUTANEOUS at 09:58

## 2023-01-29 RX ADMIN — METFORMIN HYDROCHLORIDE SCH MG: 500 TABLET ORAL at 06:34

## 2023-01-29 RX ADMIN — ENOXAPARIN SODIUM SCH MG: 40 INJECTION SUBCUTANEOUS at 09:51

## 2023-02-15 ENCOUNTER — HOSPITAL ENCOUNTER (EMERGENCY)
Dept: HOSPITAL 74 - JER | Age: 58
LOS: 1 days | Discharge: LEFT BEFORE DISCHARGE | End: 2023-02-16
Payer: COMMERCIAL

## 2023-02-15 VITALS — BODY MASS INDEX: 29.7 KG/M2

## 2023-02-15 VITALS
HEART RATE: 65 BPM | TEMPERATURE: 97.6 F | RESPIRATION RATE: 18 BRPM | DIASTOLIC BLOOD PRESSURE: 94 MMHG | SYSTOLIC BLOOD PRESSURE: 159 MMHG

## 2023-02-15 DIAGNOSIS — I45.10: Primary | ICD-10-CM

## 2023-04-16 ENCOUNTER — HOSPITAL ENCOUNTER (EMERGENCY)
Dept: HOSPITAL 74 - JERFT | Age: 58
LOS: 1 days | Discharge: HOME | End: 2023-04-17
Payer: COMMERCIAL

## 2023-04-16 VITALS
HEART RATE: 70 BPM | DIASTOLIC BLOOD PRESSURE: 84 MMHG | RESPIRATION RATE: 18 BRPM | SYSTOLIC BLOOD PRESSURE: 168 MMHG | TEMPERATURE: 98.3 F

## 2023-04-16 VITALS — BODY MASS INDEX: 29.9 KG/M2

## 2023-04-16 DIAGNOSIS — S80.11XA: Primary | ICD-10-CM
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
## 2023-04-16 DIAGNOSIS — M79.661: ICD-10-CM
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
## 2023-04-17 LAB
ALBUMIN SERPL-MCNC: 3.8 G/DL (ref 3.4–5)
ALP SERPL-CCNC: 171 U/L (ref 45–117)
ALT SERPL-CCNC: 36 U/L (ref 13–61)
ANION GAP SERPL CALC-SCNC: 7 MMOL/L (ref 8–16)
AST SERPL-CCNC: 21 U/L (ref 15–37)
BASOPHILS # BLD: 0.9 % (ref 0–2)
BILIRUB SERPL-MCNC: 0.6 MG/DL (ref 0.2–1)
BUN SERPL-MCNC: 23.5 MG/DL (ref 7–18)
CALCIUM SERPL-MCNC: 8.5 MG/DL (ref 8.5–10.1)
CHLORIDE SERPL-SCNC: 111 MMOL/L (ref 98–107)
CO2 SERPL-SCNC: 24 MMOL/L (ref 21–32)
CREAT SERPL-MCNC: 1 MG/DL (ref 0.55–1.3)
DEPRECATED RDW RBC AUTO: 13.2 % (ref 11.9–15.9)
EOSINOPHIL # BLD: 8.2 % (ref 0–4.5)
GLUCOSE SERPL-MCNC: 154 MG/DL (ref 74–106)
HCT VFR BLD CALC: 35.4 % (ref 35.4–49)
HGB BLD-MCNC: 12.2 GM/DL (ref 11.7–16.9)
INR BLD: 0.97 (ref 0.83–1.09)
LYMPHOCYTES # BLD: 30.3 % (ref 8–40)
MCH RBC QN AUTO: 27.8 PG (ref 25.7–33.7)
MCHC RBC AUTO-ENTMCNC: 34.5 G/DL (ref 32–35.9)
MCV RBC: 80.5 FL (ref 80–96)
MONOCYTES # BLD AUTO: 10 % (ref 3.8–10.2)
NEUTROPHILS # BLD: 50.6 % (ref 42.8–82.8)
PLATELET # BLD AUTO: 287 10^3/UL (ref 134–434)
PMV BLD: 8.3 FL (ref 7.5–11.1)
PROT SERPL-MCNC: 7.4 G/DL (ref 6.4–8.2)
PT PNL PPP: 11.3 SEC (ref 9.7–13)
RBC # BLD AUTO: 4.41 M/MM3 (ref 4–5.6)
SODIUM SERPL-SCNC: 142 MMOL/L (ref 136–145)
WBC # BLD AUTO: 6.9 K/MM3 (ref 4–10)